# Patient Record
Sex: FEMALE | Race: WHITE
[De-identification: names, ages, dates, MRNs, and addresses within clinical notes are randomized per-mention and may not be internally consistent; named-entity substitution may affect disease eponyms.]

---

## 2017-07-22 ENCOUNTER — HEALTH MAINTENANCE LETTER (OUTPATIENT)
Age: 62
End: 2017-07-22

## 2018-07-03 ENCOUNTER — TRANSFERRED RECORDS (OUTPATIENT)
Dept: HEALTH INFORMATION MANAGEMENT | Facility: CLINIC | Age: 63
End: 2018-07-03

## 2018-12-11 ENCOUNTER — MEDICAL CORRESPONDENCE (OUTPATIENT)
Dept: HEALTH INFORMATION MANAGEMENT | Facility: CLINIC | Age: 63
End: 2018-12-11

## 2019-03-09 ENCOUNTER — HEALTH MAINTENANCE LETTER (OUTPATIENT)
Age: 64
End: 2019-03-09

## 2019-05-23 ENCOUNTER — OFFICE VISIT (OUTPATIENT)
Dept: OPHTHALMOLOGY | Facility: CLINIC | Age: 64
End: 2019-05-23
Attending: OPHTHALMOLOGY
Payer: COMMERCIAL

## 2019-05-23 DIAGNOSIS — H47.20 OPTIC ATROPHY: ICD-10-CM

## 2019-05-23 DIAGNOSIS — H53.10 SUBJECTIVE VISUAL DISTURBANCE: Primary | ICD-10-CM

## 2019-05-23 LAB — MISCELLANEOUS TEST: NORMAL

## 2019-05-23 PROCEDURE — G0463 HOSPITAL OUTPT CLINIC VISIT: HCPCS | Mod: ZF

## 2019-05-23 PROCEDURE — 92083 EXTENDED VISUAL FIELD XM: CPT | Mod: ZF | Performed by: OPHTHALMOLOGY

## 2019-05-23 PROCEDURE — 92133 CPTRZD OPH DX IMG PST SGM ON: CPT | Mod: ZF | Performed by: OPHTHALMOLOGY

## 2019-05-23 ASSESSMENT — REFRACTION_WEARINGRX
OD_ADD: +2.75
OS_SPHERE: -2.50
OS_CYLINDER: +1.50
OD_SPHERE: +0.50
OD_AXIS: 150
OS_AXIS: 040
OS_ADD: +2.75
OD_CYLINDER: +0.50

## 2019-05-23 ASSESSMENT — EXTERNAL EXAM - LEFT EYE: OS_EXAM: NORMAL

## 2019-05-23 ASSESSMENT — CONF VISUAL FIELD
OD_SUPERIOR_TEMPORAL_RESTRICTION: 2
OD_SUPERIOR_NASAL_RESTRICTION: 2
OD_INFERIOR_TEMPORAL_RESTRICTION: 1
OS_NORMAL: 1
OD_INFERIOR_NASAL_RESTRICTION: 1

## 2019-05-23 ASSESSMENT — VISUAL ACUITY
METHOD: SNELLEN - LINEAR
OS_CC: 20/20
OD_CC: 2/200

## 2019-05-23 ASSESSMENT — TONOMETRY
OS_IOP_MMHG: 10
OD_IOP_MMHG: 8
IOP_METHOD: ICARE

## 2019-05-23 ASSESSMENT — SLIT LAMP EXAM - LIDS
COMMENTS: NORMAL
COMMENTS: NORMAL

## 2019-05-23 ASSESSMENT — EXTERNAL EXAM - RIGHT EYE: OD_EXAM: NORMAL

## 2019-05-23 NOTE — PROGRESS NOTES
Rhonda Chadwick is a 64 year old female with the following diagnoses:   1. Alteration in sensory perception as evidenced by illusions - Both Eyes    2. Subjective visual disturbance           Rhonda Chadwick was referred by [facility] for evaluation of vision changes in setting of RRMS.     HPI  Here with . Diagnosed with relapsing remitting multiple sclerosis in 2000. She has not had optic neuritis since that initial optic neuritis episode. She lost significant visual acuity and visual field only in the right eye ever since. She is not on any DMTs at this time and has not needed them over the last 12 years. MRI of neuroaxis have been stable since 2002. The sequela also includes residual spasticity and weakness on the right side.     She has had migraines since 1980s.  Since her valve replacement surgery 04/2018, she started noticing more frequent migraine w/aura at 1-2/week in which she sees a scintilating scotoma. She has always noticed ghosting of images at distance but never near. Since the valve replacement, she has noticed more frequent ghosting and trailing of images, which bothers her most when she drives or when she is in bright light environments. She takes no migraine prophylactic medications.   She endorses difficulty focusing when she is in the hot shower or stressed/fatigued at work. She does not endorse conjugate gaze issues.     Today, when she closes her eyes she can sees an outline of objects she has just viewed with eyes opened.    In the past, she wore prism lenses in the past which helped her image ghosting disturbance.     She denies area postrema symptoms.    Past medical history: stage IIIA lung adenocarcinoma s/p right lower lobe lobectomy, mitral valve insufficiency and pulmonary valve unspecified disease s/p valve replacement (04/2018), sinus sick syndrome s/p pacemaker implant, Hashimoto's thyroiditis causing hypothyroidism (1987), closed head injury (08/2018)  Past ocular  history: optic neuritis in right eye with ensuing atrophy (year 2000)  Current medications: sotalol (started 2 years ago), lisinopril, , levothyroxine, trazodone (started 10 years)  Social history: ex-smoker, minimal EtOH (2-4 drinks/week), no drugs; works in [employer]    Ocular examination:  Visual acuity 2/200 in RIGHT, 20/20 in LEFT. There is an afferent pupillary defect on the RIGHT. Color plates not attempted on right. Confrontational visual field depressed on RIGHT. Extraocular movements are orthotropic. Intraocular pressure 8 and 10.   Anterior segment exam is unremarkable.  Fundus exam shows diffuse atrophy and chronic chorioretinal scar RIGHT eye.    OCT RNFL shows severe optic atrophy on the right and thinning on the left.   Automated perimetry (G-TOP) shows superior hemifield defect.     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  This is most likely due to long history of trazodone use. She noticed these symptoms many years after starting trazodone. We discussed that if this is very bothersome could consider stopping the trazodone. She has agreed to do so.      I am concerned about her lack of recovery in the vision in the RIGHT eye following optic neuritis.  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 If labs are normal, then follow up 1 year sooner as needed for worsening symptoms.              Attending Physician Attestation:  Complete documentation of historical and exam elements from today's encounter can be found in the full encounter summary report (not reduplicated in this progress note).  I personally obtained the chief complaint(s) and history of present illness.  I confirmed and edited as necessary the review of systems, past medical/surgical history, family history,  social history, and examination findings as documented by others; and I examined the patient myself.  I personally reviewed the relevant tests, images, and reports as documented above.  I formulated and edited as necessary the assessment and plan and discussed the findings and management plan with the patient and family. - Juanito Seaman MD    The patient was assessed by the Neuro-Ophthalmology attending, Dr. Juanito Seaman.  Leo Vitale, medical student year 4  [phone]

## 2019-05-23 NOTE — NURSING NOTE
"Chief Complaints and History of Present Illnesses   Patient presents with     Blurred Vision Evaluation     Chief Complaint(s) and History of Present Illness(es)     Blurred Vision Evaluation               Comments     Rhonda Chadwick is a 64 year old female who presents today for  1. MS. Optic neuritis with optic atrophy, right eye.   2. Recent diagnosis of lung cancer with a lobectomy.    Patient has always complained of intermittent \"shadowing\" of images that happens at distance.   Episodes of subjective visual disturbance: describes as a small cloudy spot in the right eye. The spot grows in size, followed by prismatic effect of lights, and full resolution. Each episode lasts 45 minutes. At least once a week. Vision always fully recovers. Episodes are also followed by headache.   Last MRI brain was early 2018 before heart surgery. Patient currently has a pace maker.   History of subarachnoid hem 8/2018  Osmar BENAVIDES 7:54 AM May 23, 2019                   "

## 2019-05-23 NOTE — LETTER
2019         RE:  :  MRN: Rhonda Chadwick  1955  [id number]     Dear Dr. Harrison,    Thank you for asking me to see your very pleasant patient, Rhonda Chadwick, in neuro-ophthalmic consultation.  I would like to thank you for sending your records and I have summarized them in the history of present illness. She presented with her spouse who provided additional history.  My assessment and plan are below.  For further details, please see my attached clinic note.       Rhonda Chadwick is a 64 year old female with the following diagnoses:   1. Alteration in sensory perception as evidenced by illusions - Both Eyes    2. Subjective visual disturbance      Rhonda Chadwick was referred by [facility] for evaluation of vision changes in setting of RRMS.     HPI  Here with . Diagnosed with relapsing remitting multiple sclerosis in . She has not had optic neuritis since that initial optic neuritis episode. She lost significant visual acuity and visual field only in the right eye ever since. She is not on any DMTs at this time and has not needed them over the last 12 years. MRI of neuroaxis have been stable since . The sequela also includes residual spasticity and weakness on the right side.     She has had migraines since .  Since her valve replacement surgery 2018, she started noticing more frequent migraine w/aura at 1-2/week in which she sees a scintilating scotoma. She has always noticed ghosting of images at distance but never near. Since the valve replacement, she has noticed more frequent ghosting and trailing of images, which bothers her most when she drives or when she is in bright light environments. She takes no migraine prophylactic medications.   She endorses difficulty focusing when she is in the hot shower or stressed/fatigued at work. She does not endorse conjugate gaze issues.     Today, when she closes her eyes she can sees an outline of objects she has just viewed  with eyes opened.    In the past, she wore prism lenses in the past which helped her image ghosting disturbance.     She denies area postrema symptoms.    Past medical history: stage IIIA lung adenocarcinoma s/p right lower lobe lobectomy, mitral valve insufficiency and pulmonary valve unspecified disease s/p valve replacement (04/2018), sinus sick syndrome s/p pacemaker implant, Hashimoto's thyroiditis causing hypothyroidism (1987), closed head injury (08/2018)  Past ocular history: optic neuritis in right eye with ensuing atrophy (year 2000)  Current medications: sotalol (started 2 years ago), lisinopril, , levothyroxine, trazodone (started 10 years)  Social history: ex-smoker, minimal EtOH (2-4 drinks/week), no drugs; works in [employer] for [employer]    Ocular examination:  Visual acuity 2/200 in RIGHT, 20/20 in LEFT. There is an afferent pupillary defect on the RIGHT. Color plates not attempted on right. Confrontational visual field depressed on RIGHT. Extraocular movements are orthotropic. Intraocular pressure 8 and 10.   Anterior segment exam is unremarkable.  Fundus exam shows diffuse atrophy and chronic chorioretinal scar RIGHT eye.    OCT RNFL shows severe optic atrophy on the right and thinning on the left.   Automated perimetry (G-TOP) shows superior hemifield defect.     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  This is most likely due to long history of trazodone use. She noticed these symptoms many years after starting trazodone. We discussed that if this is very bothersome could consider stopping the trazodone. She has agreed to do so.      I am concerned about her lack of recovery in the vision in the RIGHT eye following optic neuritis.  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 If  labs are normal, then follow up 1 year sooner as needed for worsening symptoms.       Again, thank you for allowing me to participate in the care of your patient.      Sincerely,    Juanito Seaman MD  Professor  Ophthalmology Residency   Director of Neuro-Ophthalmology  [facility] [facility] [employer] Endowed Chair  Departments of Ophthalmology, Neurology, and Neurosurgery  [facility] [address], [place]  [address]  T - [phone]  F - [phone]  [email or web address] [email or web address]      CC: Ginger Harrison MD  [facility]  [address]  [facility] [address]  VIA Facsimile: [phone]     Mark Shrestha MD  [facility] [place]  [address]  [place] [address]  VIA Facsimile: [phone]     Xochitl Iverson DPM  [facility] [place]  [address]  [place] [address]  VIA Facsimile: [phone]    DX = palinopsia, trazodone, optic atrophy     Addendum: The [facility] is now enrolling patients in the Surgical IIHTT which randomizes patients with Idiopathic Intracranial Hypertension and moderate to severe vision loss to one of the following regimens:    1.  Acetazolamide + diet   2.  Ventriculoperitoneal shunt     3.  Optic nerve sheath fenestration     We would appreciate referral of any newly diagnosed case of bilateral papilledema, ideally before any laboratory evaluation.  Please contact our  at [email or web address] or [phone].  Thank you!        Addendum:     The FVW890 study for the treatment of anterior ischemic optic neuropathy (AION) is now underway at [facility].      This study is a randomized, double-masked study of an injectable Caspase-2 inhibitor vs sham injection for the treatment of acute anterior ischemic optic neuropathy (AION).   Patients 50-80 years of age must be enrolled within 10 days of symptom onset in order to be eligible for the treatment  trial.    Please consider referring patients immediately if you suspect the diagnosis of anterior ischemic optic neuropathy (AION) at [phone] or [email or web address].  Thank you!

## 2019-05-26 LAB
AQP4 H2O CHANNEL IGG TITR SERPL IF: NORMAL {TITER}
RESULT: NORMAL
SEND OUTS MISC TEST CODE: NORMAL
SEND OUTS MISC TEST SPECIMEN: NORMAL
TEST NAME: NORMAL

## 2019-05-27 NOTE — RESULT ENCOUNTER NOTE
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  Has your trailing issue changed since stopping trazodone?      Thank you for allowing me to share in your care.     Juanito Seaman MD

## 2019-11-03 ENCOUNTER — HEALTH MAINTENANCE LETTER (OUTPATIENT)
Age: 64
End: 2019-11-03

## 2020-11-16 ENCOUNTER — HEALTH MAINTENANCE LETTER (OUTPATIENT)
Age: 65
End: 2020-11-16

## 2021-09-18 ENCOUNTER — HEALTH MAINTENANCE LETTER (OUTPATIENT)
Age: 66
End: 2021-09-18

## 2021-11-13 ENCOUNTER — HEALTH MAINTENANCE LETTER (OUTPATIENT)
Age: 66
End: 2021-11-13

## 2022-01-08 ENCOUNTER — HEALTH MAINTENANCE LETTER (OUTPATIENT)
Age: 67
End: 2022-01-08

## 2022-03-05 ENCOUNTER — HEALTH MAINTENANCE LETTER (OUTPATIENT)
Age: 67
End: 2022-03-05

## 2022-11-20 ENCOUNTER — HEALTH MAINTENANCE LETTER (OUTPATIENT)
Age: 67
End: 2022-11-20

## 2023-04-15 ENCOUNTER — HEALTH MAINTENANCE LETTER (OUTPATIENT)
Age: 68
End: 2023-04-15

## 2023-11-19 ENCOUNTER — HEALTH MAINTENANCE LETTER (OUTPATIENT)
Age: 68
End: 2023-11-19